# Patient Record
Sex: MALE | Race: ASIAN | NOT HISPANIC OR LATINO | URBAN - METROPOLITAN AREA
[De-identification: names, ages, dates, MRNs, and addresses within clinical notes are randomized per-mention and may not be internally consistent; named-entity substitution may affect disease eponyms.]

---

## 2017-01-23 ENCOUNTER — OFFICE VISIT - HEALTHEAST (OUTPATIENT)
Dept: FAMILY MEDICINE | Facility: CLINIC | Age: 32
End: 2017-01-23

## 2017-01-23 DIAGNOSIS — Z02.89 HISTORY AND PHYSICAL EXAMINATION, IMMIGRATION: ICD-10-CM

## 2017-01-23 ASSESSMENT — MIFFLIN-ST. JEOR: SCORE: 1245.53

## 2021-05-30 VITALS — WEIGHT: 107.75 LBS | BODY MASS INDEX: 21.15 KG/M2 | HEIGHT: 60 IN

## 2021-06-08 NOTE — PROGRESS NOTES
Assessment/Plan:        Diagnoses and associated orders for this visit:      History and physical examination, immigration- Immunizations reviewed and updated.  [employer] I-693 completed and given to pt in sealed envelope.  Copy scanned into chart, copy given to pt for his records.  Rec. He establish a PCP.       Orders Placed This Encounter   Procedures     Influenza, Seasonal,Quad Inj, 36+ MOS     Tdap vaccine,  8yo or older,  IM     MMR vaccine subcutaneous           Subjective:    Patient ID: Patience Correa is a 31 y.o. male    HPI Pt is here for a green card exam, came to [place] as a refugee.  No concerns about his health. Was in [place] before coming to [place], has no medical records.   Has been in [place] about a year and has never gone to a clinic here.     The following portions of the patient's history were reviewed and updated as appropriate: allergies, current medications, past family history, past medical history, past social history, past surgical history and problem list.    Review of Systems      Neg other than HPI.     Objective:    Physical Exam          Patient is in no apparent physical distress.  Vitals are as recorded.  Head and face are normal.  Conjunctiva are clear.  Neck is without adenopathy or masses.Cardiovascular :  Regular rate and rhythm with no murmurs.  Lungs are clear with good air movement bilaterally.  Extremities are without edema.  Gait is normal.  Skin is without rashes.  Mood and affect are appropriate.